# Patient Record
Sex: MALE | URBAN - METROPOLITAN AREA
[De-identification: names, ages, dates, MRNs, and addresses within clinical notes are randomized per-mention and may not be internally consistent; named-entity substitution may affect disease eponyms.]

---

## 2021-09-22 ENCOUNTER — OUTPATIENT (OUTPATIENT)
Dept: OUTPATIENT SERVICES | Facility: HOSPITAL | Age: 36
LOS: 1 days | Discharge: HOME | End: 2021-09-22

## 2021-10-05 DIAGNOSIS — F10.10 ALCOHOL ABUSE, UNCOMPLICATED: ICD-10-CM

## 2021-10-05 DIAGNOSIS — F84.0 AUTISTIC DISORDER: ICD-10-CM

## 2021-10-05 DIAGNOSIS — F40.10 SOCIAL PHOBIA, UNSPECIFIED: ICD-10-CM

## 2021-10-05 DIAGNOSIS — F34.1 DYSTHYMIC DISORDER: ICD-10-CM

## 2021-10-05 DIAGNOSIS — F12.10 CANNABIS ABUSE, UNCOMPLICATED: ICD-10-CM

## 2021-10-05 NOTE — ECT CONSULT NOTE - NSMMSELANG_PSY_ALL_CORE
When asked to write a sentence, patient first wrote, "Hello". When asked again to write a sentence, he wrote "this is a sentence"./Named object #1 (e.g.Pen)/Named object #2 (e.g.Watch)/Repeated "no ifs, ands or buts"/Completed command (Stage #1)/Completed command (Stage #2)/Completed command (Stage #3)/Patient able to read and obey a written command/Able to write a sentence that is sensible with a subject and a verb

## 2021-10-05 NOTE — ECT CONSULT NOTE - CURRENT MEDICATION
MEDICATIONS  (STANDING):Abilify 5 mg q hs, Zoloft 300 mg, Gabapentin 400 mg q hs and 200 mg PRN, Adderall 10 mg BID

## 2021-10-05 NOTE — ECT CONSULT NOTE - RISK ASSESSMENT
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 He has autism spectrum disorder associated with social anxiety disorder and h/o 2 suicide attempts, 4 psychiatric hospitalization, social isolation and substance use history.   He however has protective factors in the form of supportive family, good therapeutic relationship (with individual therapist as well as psychiatrist), compliance with medications, help-seeking. He is currently denying death wish/suicidal ideation/intent/plan and wants to get better and be able to work. (forward thinking). He is at low acute suicide risk but is at chronic suicide risk given his psychiatric comorbidities and past actions.
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
## 2021-10-05 NOTE — ECT CONSULT NOTE - NSECTPASTTXTRIALSDETAILS_PSY_ALL_CORE
Prozac, Zoloft, Celexa, Lexapro, Effexor, Cymbalta, Remeron, Wellbutrin, Buspar, Lithium, Seroquel, Zyprexa, Risperidone, Geodon, Abilify, Adderall

## 2021-10-05 NOTE — ECT CONSULT NOTE - NSECTTIMEACTIVITIES_PSY_ALL_CORE
The patient encounter was from 9:30 am to 12:00 pm      More than 50% of the time was spent in counselling and/or coordination of care, including but not limited to discussing with patient and family risk and benefits of ECT, the usual trajectory of response with acute course of ECT, discussing the consent for ECT, reviewing ECT fasting guidelines, reviewing the need for periodic history and physical and labwork. Patient was asked to obtain labwork (CBC, BMP), EKG and medical clearance.

## 2021-10-05 NOTE — ECT CONSULT NOTE - DETAILS
Sometimes I wish "I didn't exist". Denies having such thoughts in last 2 weeks. Denies active suicidal ideation/intent/plan. No h/o suicide/Psychiatric illness in the family

## 2021-10-05 NOTE — ECT CONSULT NOTE - NSECTMENTALSTATUSEXAM_PSY_ALL_CORE
Conscious, cooperative, alert.   No psychomotor agitation/retardation.   Good eye contact.   Speech: low tone, regular rate and rhythm,   Mood: "Depressed" Affect: apathetic, restricted range.   Thought Process: linear, goal directed   Thought Content: No Death wish, No Suicidal ideation/intent/plan, No homicidal ideation/intent/plan. No delusions (h/o passive death wish> 2 weeks ago)  Perception: No hallucinations   Insight and Judgement: fair  Impulse Control: fair at this time.

## 2021-10-05 NOTE — ECT CONSULT NOTE - NSECTASSESSRECOMM_PSY_ALL_CORE
Patient is experiencing MDD superimposed on chronic dysthymia ("Double depression"). A course of ECT is indicated to treat the severe depressive symptoms that the patient is experiencing.  It was explicitly discussed that ECT is not likely to help the chronic dysthymia that the patient has been experiencing as well as the social anxiety and social skills deficits as those are better addressed in therapy as well as medication management. However, if patient's primary mood state improves, it may help facilitate his participation in other treatment modalities.        Patient is experiencing MDD superimposed on chronic dysthymia ("Double depression"). A course of ECT is indicated to treat the severe depressive symptoms that the patient is experiencing.  The degree of response may be limited by patient's coping strategies, personality style and psychosocial stressors. It was explicitly discussed that ECT is not likely to help the chronic dysthymia that the patient has been experiencing as well as the social anxiety and social skills deficits as those are better addressed in therapy as well as medication management. The goal of ECT would be to target the severe depressive symptoms of MDD, as if patient's primary mood state improves, it may help facilitate his participation in other treatment modalities.